# Patient Record
Sex: MALE | Race: OTHER | Employment: OTHER | URBAN - METROPOLITAN AREA
[De-identification: names, ages, dates, MRNs, and addresses within clinical notes are randomized per-mention and may not be internally consistent; named-entity substitution may affect disease eponyms.]

---

## 2022-10-23 ENCOUNTER — HOSPITAL ENCOUNTER (EMERGENCY)
Age: 22
Discharge: HOME | End: 2022-10-23
Attending: EMERGENCY MEDICINE
Payer: OTHER GOVERNMENT

## 2022-10-23 ENCOUNTER — APPOINTMENT (OUTPATIENT)
Dept: GENERAL RADIOLOGY | Age: 22
End: 2022-10-23
Attending: EMERGENCY MEDICINE
Payer: OTHER GOVERNMENT

## 2022-10-23 ENCOUNTER — APPOINTMENT (OUTPATIENT)
Dept: CT IMAGING | Age: 22
End: 2022-10-23
Attending: EMERGENCY MEDICINE
Payer: OTHER GOVERNMENT

## 2022-10-23 VITALS
TEMPERATURE: 98.6 F | RESPIRATION RATE: 10 BRPM | HEART RATE: 78 BPM | WEIGHT: 185 LBS | SYSTOLIC BLOOD PRESSURE: 125 MMHG | DIASTOLIC BLOOD PRESSURE: 83 MMHG | OXYGEN SATURATION: 99 % | HEIGHT: 69 IN | BODY MASS INDEX: 27.4 KG/M2

## 2022-10-23 DIAGNOSIS — M79.605 LEG PAIN, ANTERIOR, LEFT: ICD-10-CM

## 2022-10-23 DIAGNOSIS — M54.2 NECK PAIN: Primary | ICD-10-CM

## 2022-10-23 PROCEDURE — 72125 CT NECK SPINE W/O DYE: CPT

## 2022-10-23 PROCEDURE — 99285 EMERGENCY DEPT VISIT HI MDM: CPT

## 2022-10-23 PROCEDURE — 70450 CT HEAD/BRAIN W/O DYE: CPT

## 2022-10-23 PROCEDURE — 73590 X-RAY EXAM OF LOWER LEG: CPT

## 2022-10-23 RX ORDER — KETOROLAC TROMETHAMINE 10 MG/1
10 TABLET, FILM COATED ORAL
Qty: 20 TABLET | Refills: 0 | Status: SHIPPED | OUTPATIENT
Start: 2022-10-23 | End: 2022-10-28

## 2022-10-23 RX ORDER — METHOCARBAMOL 750 MG/1
750 TABLET, FILM COATED ORAL
Qty: 15 TABLET | Refills: 0 | Status: SHIPPED | OUTPATIENT
Start: 2022-10-23

## 2022-10-23 NOTE — DISCHARGE INSTRUCTIONS
Thank you! Thank you for allowing me to care for you in the emergency department. It is my goal to provide you with excellent care. If you have not received excellent quality care, please ask to speak to the nurse manager. Please fill out the survey that will come to you by mail or email since we listen to your feedback! Below you will find a list of your tests from today's visit. Should you have any questions, please do not hesitate to call the emergency department. Labs  No results found for this or any previous visit (from the past 12 hour(s)). Radiologic Studies  CT HEAD WO CONT   Final Result   1. No evidence of acute intracranial abnormality. CT SPINE CERV WO CONT   Final Result   1. No acute abnormality          XR TIB/FIB LT   Final Result   No acute abnormality. CT Results  (Last 48 hours)                 10/23/22 1029  CT HEAD WO CONT Final result    Impression:  1. No evidence of acute intracranial abnormality. Narrative:  EXAM:  CT HEAD WO CONT       INDICATION:   fall       COMPARISON: None. TECHNIQUE: Unenhanced CT of the head was performed using 5 mm images. Brain and   bone windows were generated. CT dose reduction was achieved through use of a   standardized protocol tailored for this examination and automatic exposure   control for dose modulation. FINDINGS:   The ventricles are normal in size and position. Basilar cisterns are patent. No   midline shift. There is no evidence of acute infarct, hemorrhage, or extraaxial   fluid collection. The paranasal sinuses, mastoid air cells, and middle ears are clear. The orbital   contents are within normal limits. There are no significant osseous or   extracranial soft tissue lesions. 10/23/22 1029  CT SPINE CERV WO CONT Final result    Impression:  1.  No acute abnormality           Narrative:  INDICATION:  fall 6+ft, neck pain        STUDY:  CT SPINE CERV WO CONT        Examination: Cervical spine CT. No contrast or comparisons. Thin section axial   images were obtained with sagittal and coronal reformats. CT dose reduction was   achieved through use of a standardized protocol tailored for this examination   and automatic exposure control for dose modulation. FINDINGS: Alignment of the cervical spine is normal. There is no bony   destructive lesion or evidence of acute fracture. Paraspinous soft tissues are   within normal limits. No significant degeneration or canal stenosis. CXR Results  (Last 48 hours)      None          ------------------------------------------------------------------------------------------------------------  The exam and treatment you received in the Emergency Department were for an urgent problem and are not intended as complete care. It is important that you follow-up with a doctor, nurse practitioner, or physician assistant to:  (1) confirm your diagnosis,  (2) re-evaluation of changes in your illness and treatment, and  (3) for ongoing care. Please take your discharge instructions with you when you go to your follow-up appointment. If you have any problem arranging a follow-up appointment, contact the Emergency Department. If your symptoms become worse or you do not improve as expected and you are unable to reach your health care provider, please return to the Emergency Department. We are available 24 hours a day. If a prescription has been provided, please have it filled as soon as possible to prevent a delay in treatment. If you have any questions or reservations about taking the medication due to side effects or interactions with other medications, please call your primary care provider or contact the ER.

## 2022-10-23 NOTE — ED PROVIDER NOTES
EMERGENCY DEPARTMENT HISTORY AND PHYSICAL EXAM      Date: 10/23/2022  Patient Name: Luz Maria Pratt    History of Presenting Illness     Chief Complaint   Patient presents with    Fall       History Provided By: Patient    HPI: Luz Maria Pratt, 20 y.o. male presents to the emergency department complaint of approximate 6 foot fall off of a  vehicle prior to arrival.  Patient reports he hit the front of his head on the ground and did not lose consciousness. Patient ports the pain is 5 out of 10, sharp in nature his forehead, left distal leg and neck pain. Patient reports pain is worse with movement or palpation, improved with rest.  Patient denies numbness or tingling. There are no other complaints, changes, or physical findings at this time. PCP: None    No current facility-administered medications on file prior to encounter. No current outpatient medications on file prior to encounter. Past History     Past Medical History:  No past medical history on file. Past Surgical History:  No past surgical history on file. Family History:  No family history on file. Social History: Allergies:  No Known Allergies    Review of Systems   Review of Systems  Review of Systems   Constitutional: Negative for chills and fever. HENT: Negative for sinus pressure and sinus pain. Eyes: Negative for photophobia and redness. Respiratory: Negative for shortness of breath and wheezing. Cardiovascular: Negative for chest pain and palpitations. Gastrointestinal: Negative for abdominal pain and nausea. Genitourinary: Negative for flank pain and hematuria. Musculoskeletal: Negative for arthralgias and gait problem. Skin: Negative for color change and pallor. Neurological: Negative for dizziness and weakness. Physical Exam   Physical Exam  Physical Exam  Constitutional:       General: No acute distress. Appearance: Normal appearance.   Not toxic-appearing. HENT:      Head: Normocephalic and atraumatic. Nose: Nose normal.      Mouth/Throat:      Mouth: Mucous membranes are moist.   Eyes:      Extraocular Movements: Extraocular movements intact. Pupils: Pupils are equal, round, and reactive to light. Cardiovascular:      Rate and Rhythm: Normal rate. Pulses: Normal pulses. Pulmonary:      Effort: Pulmonary effort is normal.      Breath sounds: No stridor. Abdominal:      General: Abdomen is flat. There is no distension. Musculoskeletal:         General: Normal range of motion. Cervical back: Normal range of motion and neck supple. Skin:     General: Skin is warm and dry. Capillary Refill: Capillary refill takes less than 2 seconds. Neurological:      General: No focal deficit present. Mental Status: Aert and oriented to person, place, and time. Psychiatric:         Mood and Affect: Mood normal.         Behavior: Behavior normal.     Lab and Diagnostic Study Results   Labs -   No results found for this or any previous visit (from the past 12 hour(s)). Radiologic Studies -   @lastxrresult@  CT Results  (Last 48 hours)                 10/23/22 1029  CT HEAD WO CONT Final result    Impression:  1. No evidence of acute intracranial abnormality. Narrative:  EXAM:  CT HEAD WO CONT       INDICATION:   fall       COMPARISON: None. TECHNIQUE: Unenhanced CT of the head was performed using 5 mm images. Brain and   bone windows were generated. CT dose reduction was achieved through use of a   standardized protocol tailored for this examination and automatic exposure   control for dose modulation. FINDINGS:   The ventricles are normal in size and position. Basilar cisterns are patent. No   midline shift. There is no evidence of acute infarct, hemorrhage, or extraaxial   fluid collection. The paranasal sinuses, mastoid air cells, and middle ears are clear.  The orbital   contents are within normal limits. There are no significant osseous or   extracranial soft tissue lesions. 10/23/22 1029  CT SPINE CERV WO CONT Final result    Impression:  1. No acute abnormality           Narrative:  INDICATION:  fall 6+ft, neck pain        STUDY:  CT SPINE CERV WO CONT        Examination: Cervical spine CT. No contrast or comparisons. Thin section axial   images were obtained with sagittal and coronal reformats. CT dose reduction was   achieved through use of a standardized protocol tailored for this examination   and automatic exposure control for dose modulation. FINDINGS: Alignment of the cervical spine is normal. There is no bony   destructive lesion or evidence of acute fracture. Paraspinous soft tissues are   within normal limits. No significant degeneration or canal stenosis. CXR Results  (Last 48 hours)      None            Medical Decision Making and ED Course   Differential Diagnosis & Medical Decision Making Provider Note:       - I am the first provider for this patient. I reviewed the vital signs, available nursing notes, past medical history, past surgical history, family history and social history. The patients presenting problems have been discussed, and they are in agreement with the care plan formulated and outlined with them. I have encouraged them to ask questions as they arise throughout their visit. Vital Signs-Reviewed the patient's vital signs. Patient Vitals for the past 12 hrs:   Temp Pulse Resp BP SpO2   10/23/22 1100 -- 77 12 119/81 98 %   10/23/22 1045 -- 71 10 119/77 97 %   10/23/22 1030 -- -- -- 128/86 --   10/23/22 1000 -- -- -- -- 98 %   10/23/22 0956 98.6 °F (37 °C) 90 18 123/79 98 %         Disposition   Disposition: DC- Adult Discharges: All of the diagnostic tests were reviewed and questions answered. Diagnosis, care plan and treatment options were discussed. The patient understands the instructions and will follow up as directed.  The patients results have been reviewed with them. They have been counseled regarding their diagnosis. The patient verbally convey understanding and agreement of the signs, symptoms, diagnosis, treatment and prognosis and additionally agrees to follow up as recommended with their PCP in 24 - 48 hours. They also agree with the care-plan and convey that all of their questions have been answered. I have also put together some discharge instructions for them that include: 1) educational information regarding their diagnosis, 2) how to care for their diagnosis at home, as well a 3) list of reasons why they would want to return to the ED prior to their follow-up appointment, should their condition change. DISCHARGE PLAN:  1. There are no discharge medications for this patient. 2.   Follow-up Information    None       3. Return to ED if worse   4. Current Discharge Medication List        START taking these medications    Details   ketorolac (TORADOL) 10 mg tablet Take 1 Tablet by mouth every six (6) hours as needed for Pain for up to 5 days. Qty: 20 Tablet, Refills: 0  Start date: 10/23/2022, End date: 10/28/2022      methocarbamoL (Robaxin-750) 750 mg tablet Take 1 Tablet by mouth three (3) times daily as needed for Muscle Spasm(s). Qty: 15 Tablet, Refills: 0  Start date: 10/23/2022               Diagnosis/Clinical Impression     Clinical Impression:   1. Neck pain    2. Leg pain, anterior, left        Attestations: Luis CUNNINGHAM MD, am the primary clinician of record. Please note that this dictation was completed with [facility] [employer], the computer voice recognition software. Quite often unanticipated grammatical, syntax, homophones, and other interpretive errors are inadvertently transcribed by the computer software. Please disregard these errors. Please excuse any errors that have escaped final proofreading. Thank you.

## 2022-10-23 NOTE — ED TRIAGE NOTES
Patient was standing on niiu gun when he fell 10ft to the ground hitting head +loc , complains of left leg pain 5/10 and neck pain.

## 2022-10-23 NOTE — Clinical Note
[address] [facility] EMERGENCY DEPT  [facility] [place] [id number]  [phone]    Work/School Note    Date: 10/23/2022    To Whom It May concern:    Brody Reynoso was seen and treated today in the emergency room by the following provider(s):  Attending Provider: Yogi Hubbard MD.      Brody Reynoso is excused from work/school on 10/23/22 and 10/24/22. He is medically clear to return to work/school on 10/25/2022.        Sincerely,          Comfort Hopson MD